# Patient Record
Sex: MALE | Race: WHITE | NOT HISPANIC OR LATINO | Employment: PART TIME | URBAN - METROPOLITAN AREA
[De-identification: names, ages, dates, MRNs, and addresses within clinical notes are randomized per-mention and may not be internally consistent; named-entity substitution may affect disease eponyms.]

---

## 2024-01-04 ENCOUNTER — TELEPHONE (OUTPATIENT)
Dept: OPHTHALMOLOGY | Facility: CLINIC | Age: 29
End: 2024-01-04
Payer: MEDICAID

## 2024-01-18 ENCOUNTER — OFFICE VISIT (OUTPATIENT)
Dept: OPHTHALMOLOGY | Facility: CLINIC | Age: 29
End: 2024-01-18
Payer: MEDICAID

## 2024-01-18 DIAGNOSIS — H52.13 MYOPIA OF BOTH EYES: Primary | ICD-10-CM

## 2024-01-18 DIAGNOSIS — H52.223 REGULAR ASTIGMATISM OF BOTH EYES: ICD-10-CM

## 2024-01-18 PROBLEM — L25.9 CONTACT DERMATITIS: Status: ACTIVE | Noted: 2024-01-18

## 2024-01-18 PROBLEM — K52.9 GASTROENTERITIS, ACUTE: Status: ACTIVE | Noted: 2024-01-18

## 2024-01-18 PROBLEM — F81.9 LEARNING DIFFICULTY: Status: ACTIVE | Noted: 2024-01-18

## 2024-01-18 PROBLEM — H18.832 RECURRENT EROSION OF LEFT CORNEA: Status: ACTIVE | Noted: 2024-01-18

## 2024-01-18 PROBLEM — J30.9 ALLERGIC RHINITIS: Status: ACTIVE | Noted: 2024-01-18

## 2024-01-18 PROCEDURE — 92015 DETERMINE REFRACTIVE STATE: CPT | Performed by: OPTOMETRIST

## 2024-01-18 PROCEDURE — FLVLF CONTACT LENS EVALUATION (SP): Performed by: OPTOMETRIST

## 2024-01-18 PROCEDURE — V2521 CNTCT LENS HYDROPHILIC TORIC: HCPCS | Performed by: OPTOMETRIST

## 2024-01-18 PROCEDURE — 92014 COMPRE OPH EXAM EST PT 1/>: CPT | Performed by: OPTOMETRIST

## 2024-01-18 RX ORDER — FLUTICASONE PROPIONATE 50 MCG
2 SPRAY, SUSPENSION (ML) NASAL
COMMUNITY
Start: 2019-10-08

## 2024-01-18 RX ORDER — NAPROXEN 500 MG/1
500 TABLET ORAL EVERY 12 HOURS PRN
COMMUNITY
Start: 2023-04-21

## 2024-01-18 ASSESSMENT — ENCOUNTER SYMPTOMS
CONSTITUTIONAL NEGATIVE: 0
NEUROLOGICAL NEGATIVE: 0
EYES NEGATIVE: 0
HEMATOLOGIC/LYMPHATIC NEGATIVE: 0
CARDIOVASCULAR NEGATIVE: 0
GASTROINTESTINAL NEGATIVE: 0
ENDOCRINE NEGATIVE: 0
PSYCHIATRIC NEGATIVE: 0
MUSCULOSKELETAL NEGATIVE: 0
ALLERGIC/IMMUNOLOGIC NEGATIVE: 0
RESPIRATORY NEGATIVE: 0

## 2024-01-18 ASSESSMENT — CONF VISUAL FIELD
OD_INFERIOR_TEMPORAL_RESTRICTION: 0
OD_SUPERIOR_TEMPORAL_RESTRICTION: 0
OS_SUPERIOR_TEMPORAL_RESTRICTION: 0
OS_INFERIOR_NASAL_RESTRICTION: 0
OD_INFERIOR_NASAL_RESTRICTION: 0
OD_SUPERIOR_NASAL_RESTRICTION: 0
OS_INFERIOR_TEMPORAL_RESTRICTION: 0
OD_NORMAL: 1
OS_SUPERIOR_NASAL_RESTRICTION: 0
METHOD: COUNTING FINGERS
OS_NORMAL: 1

## 2024-01-18 ASSESSMENT — REFRACTION_MANIFEST
OS_AXIS: 180
OD_SPHERE: -3.75
OS_CYLINDER: -2.25
OD_CYLINDER: -2.50
OS_SPHERE: -4.00
OD_AXIS: 008

## 2024-01-18 ASSESSMENT — VISUAL ACUITY
METHOD: SNELLEN - LINEAR
OD_CC+: -3
OS_CC+: -1
OD_CC: 20/25
VA_OR_OS_CURRENT_RX: 20/20-2
VA_OR_OD_CURRENT_RX: 20/20
CORRECTION_TYPE: CONTACTS
OS_CC: 20/20

## 2024-01-18 ASSESSMENT — REFRACTION_CURRENTRX
OS_BASECURVE: 8.7
OD_SPHERE: -4.00
OD_BRAND: BIOFINITY TORIC
OS_AXIS: 180
OD_AXIS: 010
OD_BASECURVE: 8.7
OS_SPHERE: -4.00
OS_CYLINDER: -1.75
OD_DIAMETER: 14.5
OD_CYLINDER: -2.25
OS_BRAND: BIOFINITY TORIC
OS_DIAMETER: 14.5

## 2024-01-18 ASSESSMENT — EXTERNAL EXAM - RIGHT EYE: OD_EXAM: NORMAL

## 2024-01-18 ASSESSMENT — CUP TO DISC RATIO
OD_RATIO: 0.35
OS_RATIO: 0.4

## 2024-01-18 ASSESSMENT — TONOMETRY
IOP_METHOD: GOLDMANN APPLANATION
OS_IOP_MMHG: 13
OD_IOP_MMHG: 11

## 2024-01-18 ASSESSMENT — SLIT LAMP EXAM - LIDS
COMMENTS: GOOD POSITION
COMMENTS: GOOD POSITION

## 2024-01-18 ASSESSMENT — EXTERNAL EXAM - LEFT EYE: OS_EXAM: NORMAL

## 2024-01-18 NOTE — Clinical Note
Both eyes (OU) Contact Lens Order  Quantity: 2 Package: 6 PK Appointment needed? No Medically necessary? No Ship To: Home Additional instructions: please order 1 6pk per eye and mail to patient. Charges in epic. Thanks!

## 2024-01-18 NOTE — PROGRESS NOTES
Assessment/Plan   Diagnoses and all orders for this visit:  Myopia of both eyes  Regular astigmatism of both eyes  - Small Rx change right eye in both glasses and contacts.  Pt preferred +0.75 over-refraction over habitual contact l  New spec rx released today per patient request. Ocular health wnl for age OU. Monitor 1 year or sooner prn. Refraction billed today.  Discussed proper wear, care, replacement of contact lenses. Gave handout. D/c cl wear and RTC if eyes become red, painful, irritated. Monitor 1 year.   CL eval billed today. $35

## 2024-07-09 ENCOUNTER — TELEPHONE (OUTPATIENT)
Dept: OPHTHALMOLOGY | Facility: CLINIC | Age: 29
End: 2024-07-09
Payer: MEDICAID

## 2024-07-09 NOTE — TELEPHONE ENCOUNTER
Returned call to order lenses. Got message stating patient is unable to receive calls. Will wait for return phone call.

## 2024-07-25 ENCOUNTER — TELEPHONE (OUTPATIENT)
Dept: OPHTHALMOLOGY | Facility: CLINIC | Age: 29
End: 2024-07-25
Payer: MEDICAID

## 2024-07-30 ENCOUNTER — TELEPHONE (OUTPATIENT)
Dept: OPHTHALMOLOGY | Facility: CLINIC | Age: 29
End: 2024-07-30
Payer: MEDICAID

## 2024-07-31 ENCOUNTER — TELEPHONE (OUTPATIENT)
Dept: OPHTHALMOLOGY | Facility: CLINIC | Age: 29
End: 2024-07-31
Payer: MEDICAID

## 2024-08-01 ENCOUNTER — TELEPHONE (OUTPATIENT)
Dept: OPHTHALMOLOGY | Facility: CLINIC | Age: 29
End: 2024-08-01
Payer: MEDICAID

## 2025-01-09 ENCOUNTER — TELEPHONE (OUTPATIENT)
Dept: OPHTHALMOLOGY | Facility: CLINIC | Age: 30
End: 2025-01-09
Payer: MEDICAID

## 2025-01-17 ENCOUNTER — TELEPHONE (OUTPATIENT)
Dept: OPHTHALMOLOGY | Facility: CLINIC | Age: 30
End: 2025-01-17
Payer: MEDICAID

## 2025-01-17 NOTE — TELEPHONE ENCOUNTER
Mom called to order lenses.  I left a VM reminding her that we placed an order for trials last week to get him to apt.  Trials are arriving today by 6:10pm.

## 2025-01-20 ENCOUNTER — APPOINTMENT (OUTPATIENT)
Dept: OPHTHALMOLOGY | Facility: CLINIC | Age: 30
End: 2025-01-20
Payer: MEDICAID

## 2025-01-20 DIAGNOSIS — H52.13 MYOPIA OF BOTH EYES: Primary | ICD-10-CM

## 2025-01-20 DIAGNOSIS — H52.223 REGULAR ASTIGMATISM OF BOTH EYES: ICD-10-CM

## 2025-01-20 PROBLEM — H52.10 MYOPIA: Status: ACTIVE | Noted: 2025-01-20

## 2025-01-20 PROBLEM — H52.203 ASTIGMATISM OF BOTH EYES: Status: ACTIVE | Noted: 2025-01-20

## 2025-01-20 PROCEDURE — 92015 DETERMINE REFRACTIVE STATE: CPT | Performed by: OPTOMETRIST

## 2025-01-20 PROCEDURE — 92014 COMPRE OPH EXAM EST PT 1/>: CPT | Performed by: OPTOMETRIST

## 2025-01-20 PROCEDURE — V2521 CNTCT LENS HYDROPHILIC TORIC: HCPCS | Performed by: OPTOMETRIST

## 2025-01-20 PROCEDURE — FLVLF CONTACT LENS EVALUATION (SP): Performed by: OPTOMETRIST

## 2025-01-20 ASSESSMENT — REFRACTION_CURRENTRX
OS_BRAND: BIOFINITY TORIC
OS_CYLINDER: -1.75
OS_DIAMETER: 14.5
OD_BRAND: BIOFINITY TORIC
OS_BASECURVE: 8.7
OS_BRAND: BIOFINITY TORIC
OS_CYLINDER: -1.75
OS_AXIS: 180
OD_SPHERE: -3.50
OD_CYLINDER: -2.25
OS_SPHERE: -4.00
OS_BASECURVE: 8.7
OD_SPHERE: -3.50
OD_DIAMETER: 14.5
OD_AXIS: 010
OD_AXIS: 010
OS_SPHERE: -4.00
OD_BASECURVE: 8.7
OD_BASECURVE: 8.7
OD_BRAND: BIOFINITY TORIC
OS_DIAMETER: 14.5
OD_DIAMETER: 14.5
OD_CYLINDER: -2.25
OS_AXIS: 180

## 2025-01-20 ASSESSMENT — VISUAL ACUITY
VA_OR_OS_CURRENT_RX: 20/20
VA_OR_OD_CURRENT_RX: 20/20
METHOD: SNELLEN - LINEAR
OD_CC: 20/20
OD_CC+: -2
OS_CC+: -2
OS_CC: 20/20
CORRECTION_TYPE: CONTACTS

## 2025-01-20 ASSESSMENT — ENCOUNTER SYMPTOMS
GASTROINTESTINAL NEGATIVE: 0
EYES NEGATIVE: 0
CARDIOVASCULAR NEGATIVE: 0
MUSCULOSKELETAL NEGATIVE: 0
ALLERGIC/IMMUNOLOGIC NEGATIVE: 0
NEUROLOGICAL NEGATIVE: 0
PSYCHIATRIC NEGATIVE: 0
CONSTITUTIONAL NEGATIVE: 0
RESPIRATORY NEGATIVE: 0
HEMATOLOGIC/LYMPHATIC NEGATIVE: 0
ENDOCRINE NEGATIVE: 0

## 2025-01-20 ASSESSMENT — CONF VISUAL FIELD
OS_SUPERIOR_NASAL_RESTRICTION: 0
OD_INFERIOR_TEMPORAL_RESTRICTION: 0
OD_NORMAL: 1
OD_INFERIOR_NASAL_RESTRICTION: 0
OS_INFERIOR_NASAL_RESTRICTION: 0
OD_SUPERIOR_TEMPORAL_RESTRICTION: 0
OS_INFERIOR_TEMPORAL_RESTRICTION: 0
OS_SUPERIOR_TEMPORAL_RESTRICTION: 0
OS_NORMAL: 1
METHOD: COUNTING FINGERS
OD_SUPERIOR_NASAL_RESTRICTION: 0

## 2025-01-20 ASSESSMENT — EXTERNAL EXAM - RIGHT EYE: OD_EXAM: NORMAL

## 2025-01-20 ASSESSMENT — TONOMETRY
OS_IOP_MMHG: 11
OD_IOP_MMHG: 11
IOP_METHOD: TONOPEN

## 2025-01-20 ASSESSMENT — REFRACTION_MANIFEST
OS_AXIS: 180
OD_SPHERE: -3.75
OS_CYLINDER: -2.50
OD_AXIS: 008
OD_CYLINDER: -2.50
OS_SPHERE: -4.25

## 2025-01-20 ASSESSMENT — CUP TO DISC RATIO
OS_RATIO: 0.4
OD_RATIO: 0.35

## 2025-01-20 ASSESSMENT — EXTERNAL EXAM - LEFT EYE: OS_EXAM: NORMAL

## 2025-01-20 ASSESSMENT — SLIT LAMP EXAM - LIDS
COMMENTS: GOOD POSITION
COMMENTS: GOOD POSITION

## 2025-01-20 NOTE — Clinical Note
Both eyes (OU) Contact Lens Order Contact Lens Current Rx        Current Contact Lens Rx #2      Brand Base Curve Diameter Sphere Cylinder Axis Lens Over-Sphere  Over-Cyl   Right Biofinity Toric 8.7 14.5 -3.50 -2.25 010 Soft     Left Biofinity Toric 8.7 14.5 -4.00 -1.75 180 Soft       Quantity: 2 Package: 6 PK Appointment needed? No Medically necessary? No Ship To: Home Additional instructions: Please order 6 month supply and mail to patient.

## 2025-01-20 NOTE — PROGRESS NOTES
Assessment/Plan   Diagnoses and all orders for this visit:  Myopia of both eyes  Regular astigmatism of both eyes  New spec rx released today per patient request. Ocular health wnl for age OU. Monitor 1 year or sooner prn. Refraction billed today. Pt consents to receiving glasses Rx today. Patient's/guardian's signature obtained to acknowledge and confirm that a paper copy of glasses Rx was given to patient in compliance with [facility] Eyeglass Rule. Electronic copy of Rx will also be available via [employer]EPIC.   Discussed proper wear, care, replacement of contact lenses. Gave handout. D/c cl wear and RTC if eyes become red, painful, irritated. Monitor 1 year.   CL eval billed today. $35  6 months supply of lenses ordered, back up trials given.

## 2025-06-03 ENCOUNTER — TELEPHONE (OUTPATIENT)
Dept: OPHTHALMOLOGY | Age: 30
End: 2025-06-03
Payer: MEDICAID

## 2025-06-03 NOTE — TELEPHONE ENCOUNTER
Returned call to order lenses. Voicemail box was full. Unable to leave message.   Will wait for return call to order.

## 2025-06-04 ENCOUNTER — TELEPHONE (OUTPATIENT)
Dept: OPHTHALMOLOGY | Age: 30
End: 2025-06-04
Payer: MEDICAID

## 2025-06-04 NOTE — TELEPHONE ENCOUNTER
Returned call to order contact lenses. Patients voice mailbox is full.   Unable to leave message.  Will wait for return call

## 2025-06-27 ENCOUNTER — TELEPHONE (OUTPATIENT)
Dept: OPHTHALMOLOGY | Facility: CLINIC | Age: 30
End: 2025-06-27
Payer: MEDICAID

## 2025-07-01 ENCOUNTER — TELEPHONE (OUTPATIENT)
Dept: OPHTHALMOLOGY | Facility: CLINIC | Age: 30
End: 2025-07-01